# Patient Record
Sex: FEMALE | Race: ASIAN | Employment: FULL TIME | URBAN - METROPOLITAN AREA
[De-identification: names, ages, dates, MRNs, and addresses within clinical notes are randomized per-mention and may not be internally consistent; named-entity substitution may affect disease eponyms.]

---

## 2017-08-09 ENCOUNTER — OFFICE VISIT (OUTPATIENT)
Dept: FAMILY MEDICINE CLINIC | Facility: CLINIC | Age: 22
End: 2017-08-09

## 2017-08-09 VITALS
SYSTOLIC BLOOD PRESSURE: 108 MMHG | OXYGEN SATURATION: 98 % | RESPIRATION RATE: 18 BRPM | TEMPERATURE: 98 F | DIASTOLIC BLOOD PRESSURE: 74 MMHG | HEART RATE: 118 BPM

## 2017-08-09 DIAGNOSIS — K52.9 GASTROENTERITIS: Primary | ICD-10-CM

## 2017-08-09 DIAGNOSIS — E86.0 DEHYDRATION: ICD-10-CM

## 2017-08-09 PROCEDURE — 99203 OFFICE O/P NEW LOW 30 MIN: CPT | Performed by: NURSE PRACTITIONER

## 2017-08-09 NOTE — PROGRESS NOTES
CHIEF COMPLAINT:   No chief complaint on file. HPI:   Kim Alaniz is a 23year old female who presents for complaints of n/v/d. Unsure if related but ate at [facility] on Sunday, symptoms started in middle of night after eating there.   Sympt GI: No visible scars or masses. BS's present x4. No palpable masses or hepatosplenomegaly. no tenderness upon palpation in abdominal quads x4. No rebound tenderness. Negative fontenot's sign.    EXTREMITIES: no cyanosis, clubbing or edema    ASSESSMENT AND · If medicines for diarrhea or vomiting are prescribed, take only as directed. General care and preventing spread of the illness  · If symptoms are severe, rest at home for the next 24 hours or until you feel better.   · Hand washing with soap and water is · Gradually resume a normal diet, as you feel better and your symptoms improve. · If at any time your symptoms start getting worse, go back to clear liquids until you feel better.   Food preparation  · If you have diarrhea, you should not prepare food for The patient indicates understanding of these issues and agrees to the plan. The patient is asked to see PCP if no improvement in 2-3 days.

## (undated) NOTE — LETTER
Date: 8/9/2017    Patient Name: Winnie Lua          To Whom it may concern: This letter has been written at the patient's request. The above patient was seen at the [facility] for treatment of a medical condition.     This patient s